# Patient Record
Sex: MALE | URBAN - METROPOLITAN AREA
[De-identification: names, ages, dates, MRNs, and addresses within clinical notes are randomized per-mention and may not be internally consistent; named-entity substitution may affect disease eponyms.]

---

## 2017-09-07 ENCOUNTER — OUTPATIENT (OUTPATIENT)
Dept: OUTPATIENT SERVICES | Facility: HOSPITAL | Age: 37
LOS: 1 days | End: 2017-09-07

## 2017-09-07 VITALS
DIASTOLIC BLOOD PRESSURE: 86 MMHG | HEART RATE: 68 BPM | SYSTOLIC BLOOD PRESSURE: 122 MMHG | HEIGHT: 73 IN | WEIGHT: 225.09 LBS | TEMPERATURE: 98 F | RESPIRATION RATE: 14 BRPM

## 2017-09-07 DIAGNOSIS — Z98.890 OTHER SPECIFIED POSTPROCEDURAL STATES: Chronic | ICD-10-CM

## 2017-09-07 DIAGNOSIS — S61.512A LACERATION WITHOUT FOREIGN BODY OF LEFT WRIST, INITIAL ENCOUNTER: ICD-10-CM

## 2017-09-07 LAB
HCT VFR BLD CALC: 44.4 % — SIGNIFICANT CHANGE UP (ref 39–50)
HGB BLD-MCNC: 14.9 G/DL — SIGNIFICANT CHANGE UP (ref 13–17)
MCHC RBC-ENTMCNC: 29.2 PG — SIGNIFICANT CHANGE UP (ref 27–34)
MCHC RBC-ENTMCNC: 33.6 % — SIGNIFICANT CHANGE UP (ref 32–36)
MCV RBC AUTO: 87.1 FL — SIGNIFICANT CHANGE UP (ref 80–100)
NRBC # FLD: 0 — SIGNIFICANT CHANGE UP
PLATELET # BLD AUTO: 152 K/UL — SIGNIFICANT CHANGE UP (ref 150–400)
PMV BLD: 12.3 FL — SIGNIFICANT CHANGE UP (ref 7–13)
RBC # BLD: 5.1 M/UL — SIGNIFICANT CHANGE UP (ref 4.2–5.8)
RBC # FLD: 12.6 % — SIGNIFICANT CHANGE UP (ref 10.3–14.5)
WBC # BLD: 8.01 K/UL — SIGNIFICANT CHANGE UP (ref 3.8–10.5)
WBC # FLD AUTO: 8.01 K/UL — SIGNIFICANT CHANGE UP (ref 3.8–10.5)

## 2017-09-07 NOTE — H&P PST ADULT - PMH
GERD (gastroesophageal reflux disease) GERD (gastroesophageal reflux disease)    Laceration without foreign body of left wrist, initial encounter

## 2017-09-07 NOTE — H&P PST ADULT - NSANTHOSAYNRD_GEN_A_CORE
No. ERNESTINA screening performed.  STOP BANG Legend: 0-2 = LOW Risk; 3-4 = INTERMEDIATE Risk; 5-8 = HIGH Risk

## 2017-09-07 NOTE — H&P PST ADULT - NEGATIVE CARDIOVASCULAR SYMPTOMS
no orthopnea/no paroxysmal nocturnal dyspnea/no palpitations/no peripheral edema/no dyspnea on exertion/no claudication/no chest pain

## 2017-09-07 NOTE — H&P PST ADULT - HISTORY OF PRESENT ILLNESS
36 y/o male with PMH: GERD presents to PST for pre op evaluation with hx of left wrist injury opening a box and slipped cut my wrist open on 08/31/17. Seen at [facility] ED. Then referred to surgeon 36 y/o  male with PMH: GERD presents to PST for pre op evaluation with hx of left wrist injury sustained on 08/31/17. Pt stated that he "was opening a box and slipped cut my wrist open". Pt was seen at [facility] ED, then referred to surgeon for further consultation. Now scheduled for left wrist exploration and nerve wrapping on 09/11/17.

## 2017-09-07 NOTE — H&P PST ADULT - PROBLEM SELECTOR PLAN 1
Scheduled for left wrist exploration and nerve wrapping on 09/11/17. Pre op instructions, famotidine given and explained. Pt verbalized understanding.

## 2017-09-07 NOTE — H&P PST ADULT - MUSCULOSKELETAL
details… detailed exam no joint warmth/left wrist splint in place, fingers warm and mobile/no joint erythema/no calf tenderness/decreased ROM due to pain

## 2017-09-07 NOTE — H&P PST ADULT - FAMILY HISTORY
Mother  Still living? Yes, Estimated age: Age Unknown  Type 2 diabetes mellitus, Age at diagnosis: Age Unknown  Family history of thyroid disease, Age at diagnosis: Age Unknown     Sibling  Still living? Unknown  Family history of thyroid disease, Age at diagnosis: Age Unknown

## 2017-09-07 NOTE — H&P PST ADULT - NEGATIVE MUSCULOSKELETAL SYMPTOMS
no leg pain R/no arthritis/no joint swelling/no muscle cramps/no muscle weakness/no back pain/no arthralgia/no myalgia/no neck pain/no leg pain L

## 2017-09-07 NOTE — H&P PST ADULT - NEGATIVE GASTROINTESTINAL SYMPTOMS
no nausea/no flatulence/no constipation/no diarrhea/no vomiting/no jaundice/no hiccoughs/no melena no nausea/no vomiting/no constipation/no diarrhea/no flatulence/no abdominal pain/no hiccoughs/no melena/no jaundice

## 2017-09-07 NOTE — H&P PST ADULT - RS GEN PE MLT RESP DETAILS PC
no intercostal retractions/no chest wall tenderness/no rhonchi/breath sounds equal/respirations non-labored/good air movement/clear to auscultation bilaterally/no wheezes/airway patent/no subcutaneous emphysema/no rales

## 2017-09-19 RX ORDER — OMEPRAZOLE 10 MG/1
1 CAPSULE, DELAYED RELEASE ORAL
Qty: 0 | Refills: 0 | COMMUNITY

## 2021-08-26 NOTE — H&P PST ADULT - FUNCTIONAL LEVEL PRIOR: TRANSFERRING
The patient came to the ED for right leg swelling, redness and warmth x1 week. Pt states he had a DVT in his right leg one year ago and had it removed. Pt states he was prescribed Eliquis but stopped taking the medication 6 months ago due to the high cost. Pt is ambulatory and rating right calf pain 3/10. Denies chest pain or SOB. Respirations even and unlabored.
(0) independent